# Patient Record
Sex: FEMALE | Race: WHITE | NOT HISPANIC OR LATINO | Employment: UNEMPLOYED | URBAN - NONMETROPOLITAN AREA
[De-identification: names, ages, dates, MRNs, and addresses within clinical notes are randomized per-mention and may not be internally consistent; named-entity substitution may affect disease eponyms.]

---

## 2022-07-16 ENCOUNTER — HOSPITAL ENCOUNTER (EMERGENCY)
Facility: HOSPITAL | Age: 47
Discharge: HOME | End: 2022-07-16
Attending: EMERGENCY MEDICINE | Admitting: EMERGENCY MEDICINE

## 2022-07-16 VITALS
BODY MASS INDEX: 19.62 KG/M2 | TEMPERATURE: 98.1 F | DIASTOLIC BLOOD PRESSURE: 63 MMHG | RESPIRATION RATE: 12 BRPM | SYSTOLIC BLOOD PRESSURE: 94 MMHG | HEART RATE: 75 BPM | HEIGHT: 67 IN | OXYGEN SATURATION: 96 % | WEIGHT: 125 LBS

## 2022-07-16 DIAGNOSIS — R53.1 GENERALIZED WEAKNESS: Primary | ICD-10-CM

## 2022-07-16 LAB
AMPHET+METHAMPHET UR QL: NEGATIVE
AMPHETAMINES UR QL: NEGATIVE
BARBITURATES UR QL SCN: NEGATIVE
BENZODIAZ UR QL SCN: NEGATIVE
BUPRENORPHINE SERPL-MCNC: NEGATIVE NG/ML
CANNABINOIDS SERPL QL: NEGATIVE
COCAINE UR QL: NEGATIVE
METHADONE UR QL SCN: NEGATIVE
OPIATES UR QL: NEGATIVE
OXYCODONE UR QL SCN: NEGATIVE
PCP UR QL SCN: NEGATIVE
PROPOXYPH UR QL: NEGATIVE
TRICYCLICS UR QL SCN: NEGATIVE

## 2022-07-16 PROCEDURE — 99283 EMERGENCY DEPT VISIT LOW MDM: CPT

## 2022-07-16 PROCEDURE — 80306 DRUG TEST PRSMV INSTRMNT: CPT | Performed by: EMERGENCY MEDICINE

## 2022-07-16 NOTE — DISCHARGE INSTRUCTIONS
Follow up with one of the [facility] physician groups below to setup primary care. If you have trouble making an appointment, please call the [facility] Nurse Line at [phone]    [facility] Primary Care - [address]  [id number] [phone]    [facility] Internal Medicine - [address], [address], [place], [place] [id number] [phone]    [facility] Family & Internal Medicine - [address] [address], [address], [place] [address] [phone]     [facility] Primary Care (hospitals) - [place] [address] [facility], [address], [place] [address] [phone]    [facility] Primary Care - [address], [address] [phone]    [facility] Family Medicine - [address], [place] [address] [phone]    [facility] Family Medicine - [place]  [address], [place], [address] [phone]    [facility] Family Medicine - [place]  [address], [address] [phone]    [facility] Primary Care - [address] [address] [phone]    [facility] Family Medicine - [place]  [address] [place] [address], [place], [address] [phone]        PEDIATRIC:    [facility] Pediatrics - [address] [address], [address], [place] [address] [phone]

## 2022-07-16 NOTE — ED PROVIDER NOTES
Subjective   Patient is a 46-year-old female who presents to the ER with altered mental status.  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  Patient states that while at work she started feeling weak and dizzy and felt like she was in a fog.  She also complains of a mild left-sided headache.  Patient's employer states that 2 narcotics are missing from the pillbox and they were concerned the patient may have taken the medication.  They brought her here for drug screen.  Patient denies any recent trauma.  She denies any fever, chest pain, shortness of air, abdominal pain, nausea vomiting diarrhea, urinary changes, focal neurologic changes.          Review of Systems   Eyes: Negative.    Respiratory: Negative.    Cardiovascular: Negative.    Gastrointestinal: Negative.    Endocrine: Negative.    Genitourinary: Negative.    Musculoskeletal: Negative.    Skin: Negative.    Allergic/Immunologic: Negative.    Neurological: Positive for dizziness, weakness and headaches.   Hematological: Negative.    Psychiatric/Behavioral: Positive for confusion.   All other systems reviewed and are negative.      Past Medical History:   Diagnosis Date   • Fibromyalgia        Allergies   Allergen Reactions   • Midodrine Shortness Of Breath       Past Surgical History:   Procedure Laterality Date   •  SECTION         History reviewed. No pertinent family history.    Social History     Socioeconomic History   • Marital status: Single   Tobacco Use   • Smoking status: Current Every Day Smoker     Packs/day: 1.00   Substance and Sexual Activity   • Alcohol use: Yes     Comment: OCC   • Drug use: Not Currently           Objective   Physical Exam  Vitals and nursing note reviewed.   Constitutional:       Appearance: She is well-developed.   HENT:      Head: Normocephalic and atraumatic.   Eyes:      Conjunctiva/sclera: Conjunctivae normal.      Pupils: Pupils are equal, round, and reactive to light.   Cardiovascular:       Rate and Rhythm: Normal rate and regular rhythm.      Heart sounds: Normal heart sounds.   Pulmonary:      Effort: Pulmonary effort is normal.      Breath sounds: Normal breath sounds.   Abdominal:      Palpations: Abdomen is soft.      Tenderness: There is no abdominal tenderness.   Musculoskeletal:         General: No deformity. Normal range of motion.      Cervical back: Normal range of motion.   Skin:     General: Skin is warm.   Neurological:      Mental Status: She is alert and oriented to person, place, and time.      Cranial Nerves: Cranial nerves are intact.      Sensory: Sensation is intact.      Motor: Motor function is intact.      Coordination: Coordination is intact.   Psychiatric:         Behavior: Behavior normal.         Procedures           ED Course      Patient is very adamant that she wants no other work-up besides the drug screen while here in the ER.  She states she does not have any insurance at this time.  She understands the risk of missing serious diagnoses that could result in permanent disability or death but she still chose for for no further work-up at this time.  She is requesting a referral to a PCP.     Lab Results (last 24 hours)     Procedure Component Value Units Date/Time    Urine Drug Screen - Urine, Clean Catch [[id number]]  (Normal) Collected: 07/16/22 0910    Specimen: Urine, Clean Catch Updated: 07/16/22 0927     THC, Screen, Urine Negative     Phencyclidine (PCP), Urine Negative     Cocaine Screen, Urine Negative     Methamphetamine, Ur Negative     Opiate Screen Negative     Amphetamine Screen, Urine Negative     Benzodiazepine Screen, Urine Negative     Tricyclic Antidepressants Screen Negative     Methadone Screen, Urine Negative     Barbiturates Screen, Urine Negative     Oxycodone Screen, Urine Negative     Propoxyphene Screen Negative     Buprenorphine, Screen, Urine Negative    Narrative:      Cutoff For Drugs Screened:    Amphetamines               500  ng/ml  Barbiturates               200 ng/ml  Benzodiazepines            150 ng/ml  Cocaine                    150 ng/ml  Methadone                  200 ng/ml  Opiates                    100 ng/ml  Phencyclidine               25 ng/ml  THC                            50 ng/ml  Methamphetamine            500 ng/ml  Tricyclic Antidepressants  300 ng/ml  Oxycodone                  100 ng/ml  Propoxyphene               300 ng/ml  Buprenorphine               10 ng/ml    The normal value for all drugs tested is negative. This report includes unconfirmed screening results, with the cutoff values listed, to be used for medical treatment purposes only.  Unconfirmed results must not be used for non-medical purposes such as employment or legal testing.  Clinical consideration should be applied to any drug of abuse test, particularly when unconfirmed results are used.          Drug screen was negative.  I spoke with the patient once again and offered to perform labs and imaging but patient continued to deny treatment.  She was very sleepy but oriented x3.  She states her father will come pick her up.  I highly advised she not drive.  She understands the risk of missing serious illness that could lead to permanent disability or death.  She is to return if she changes her mind about any further treatment.  Patient and her boss agreeable.                                MDM    Final diagnoses:   Generalized weakness       ED Disposition  ED Disposition     ED Disposition   Discharge    Condition   Stable    Comment   --             Provider, No Known  [facility]  [place] [address]  [phone]    Schedule an appointment as soon as possible for a visit            Medication List      No changes were made to your prescriptions during this visit.          Robyn Lin MD  07/16/22 9293

## 2023-01-27 ENCOUNTER — TELEPHONE (OUTPATIENT)
Dept: NEUROLOGY | Age: 48
End: 2023-01-27

## 2023-02-28 ENCOUNTER — OFFICE VISIT (OUTPATIENT)
Dept: NEUROLOGY | Age: 48
End: 2023-02-28
Payer: COMMERCIAL

## 2023-02-28 VITALS
OXYGEN SATURATION: 98 % | WEIGHT: 127 LBS | DIASTOLIC BLOOD PRESSURE: 71 MMHG | HEART RATE: 84 BPM | BODY MASS INDEX: 19.93 KG/M2 | HEIGHT: 67 IN | SYSTOLIC BLOOD PRESSURE: 126 MMHG

## 2023-02-28 DIAGNOSIS — R11.2 NAUSEA AND VOMITING, UNSPECIFIED VOMITING TYPE: ICD-10-CM

## 2023-02-28 DIAGNOSIS — G43.119 INTRACTABLE MIGRAINE WITH AURA WITHOUT STATUS MIGRAINOSUS: Primary | ICD-10-CM

## 2023-02-28 PROCEDURE — 99214 OFFICE O/P EST MOD 30 MIN: CPT | Performed by: NURSE PRACTITIONER

## 2023-02-28 RX ORDER — BUTALBITAL, ACETAMINOPHEN AND CAFFEINE 300; 40; 50 MG/1; MG/1; MG/1
CAPSULE ORAL
COMMUNITY
Start: 2023-02-24

## 2023-02-28 RX ORDER — METHYLPREDNISOLONE 4 MG/1
TABLET ORAL
Qty: 1 KIT | Refills: 0 | Status: SHIPPED | OUTPATIENT
Start: 2023-02-28 | End: 2023-03-06

## 2023-02-28 RX ORDER — ONDANSETRON 4 MG/1
4 TABLET, FILM COATED ORAL DAILY PRN
Qty: 30 TABLET | Refills: 0 | Status: SHIPPED | OUTPATIENT
Start: 2023-02-28

## 2023-02-28 RX ORDER — OMEPRAZOLE 20 MG/1
CAPSULE, DELAYED RELEASE ORAL
COMMUNITY
Start: 2023-02-20

## 2023-02-28 RX ORDER — AMITRIPTYLINE HYDROCHLORIDE 10 MG/1
TABLET, FILM COATED ORAL
COMMUNITY
Start: 2023-02-27

## 2023-02-28 RX ORDER — AZELASTINE HYDROCHLORIDE 0.5 MG/ML
SOLUTION/ DROPS OPHTHALMIC
COMMUNITY
Start: 2023-02-19

## 2023-02-28 RX ORDER — LAMOTRIGINE 200 MG/1
TABLET ORAL
COMMUNITY
Start: 2023-02-15

## 2023-02-28 RX ORDER — UBROGEPANT 100 MG/1
TABLET ORAL
Qty: 16 TABLET | Refills: 3 | Status: SHIPPED | OUTPATIENT
Start: 2023-02-28

## 2023-02-28 RX ORDER — ALBUTEROL SULFATE 90 UG/1
AEROSOL, METERED RESPIRATORY (INHALATION)
COMMUNITY
Start: 2023-02-20

## 2023-02-28 RX ORDER — ATOGEPANT 60 MG/1
60 TABLET ORAL DAILY
Qty: 30 TABLET | Refills: 3 | Status: SHIPPED | OUTPATIENT
Start: 2023-02-28

## 2023-02-28 RX ORDER — ALPRAZOLAM 0.25 MG/1
TABLET ORAL
COMMUNITY
Start: 2023-01-25

## 2023-02-28 RX ORDER — TOPIRAMATE 100 MG/1
TABLET, FILM COATED ORAL
COMMUNITY
Start: 2023-02-20

## 2023-02-28 RX ORDER — DULOXETIN HYDROCHLORIDE 30 MG/1
CAPSULE, DELAYED RELEASE ORAL
COMMUNITY
Start: 2023-02-14

## 2023-02-28 NOTE — PROGRESS NOTES
[facility] NEUROLOGY:    Patient: Nahomy Mares   :  1975  Age:  50 y.o. MRN:  [id number]  Today:  23    Provider: DEVON Pennington    Chief Complaint:  Chief Complaint   Patient presents with    New Patient    Migraine       HISTORY OF PRESENT ILLNESS:   Nahomy Mares is a 50y.o. year old female was referred for headaches. Headaches first started when she was 11years old and have progressively worsened. Pain is located to the left retro-orbital area and then spreads to left side and is described as dull to sharp throbbing pain that is severe in nature. Headaches are daily. Headaches do wake her from sleep. There is associated photophobia, phonophobia, nausea. Denies diplopia, dawood visual loss, jaw claudication. She describes scintillating scotoma, states she has a blurred spot in vision preceding headaches. She also has some twitching to left eye as well. Denies lacrimation, conjunctival injection, mental status changes, or ptosis. Headaches are not aggravated by position changes. Headaches are aborted by Fioricet in the past, laying in cold dark room. They are triggered by stress, weather changes, chocolate. She is on Elavil 10 mg daily, Topamax 100 mg, Lamictal 200 mg daily for prevention. She has tried Propranolol in the past, issues with hypotension. She has been on Imitrex in the past and it did not help. She states she had possible issue with this in the past, had right sided weakness. She was on Midrine after this and stopped having benefit. She states that Fioricet does work in the past. Has seen Neurology in the past. Last MRI years ago. She also reports some issues with imbalance and tremor; this is fairly new. Is falling into walls at times. Tremor to bilateral hands, ongoing for some time. Mom has ET versus parkinson's disease. There is known Parksinon's disease on dads side.      Additional Relevant History:  History of head/neck trauma:  no  History of head/neck surgery: no  Family h/o headaches or aneurysm:  no, no. PAST MEDICAL HISTORY:    Medical History:  History reviewed. No pertinent past medical history. Surgical History:  History reviewed. No pertinent surgical history. Current Medications:  Current Outpatient Medications   Medication Sig Dispense Refill    albuterol sulfate HFA (PROVENTIL;VENTOLIN;PROAIR) 108 (90 Base) MCG/ACT inhaler       ALPRAZolam (XANAX) 0.25 MG tablet       amitriptyline (ELAVIL) 10 MG tablet       azelastine (OPTIVAR) 0.05 % ophthalmic solution       butalbital-APAP-caffeine -40 MG CAPS per capsule       DULoxetine (CYMBALTA) 30 MG extended release capsule       BREO ELLIPTA 200-25 MCG/ACT AEPB inhaler       lamoTRIgine (LAMICTAL) 200 MG tablet       omeprazole (PRILOSEC) 20 MG delayed release capsule       topiramate (TOPAMAX) 100 MG tablet       Ubrogepant (UBRELVY) 100 MG TABS Take 1 tablet at the onset of migraine. May repeat once in 2 hours if no improvement. Do not exceed 2 tablets in 24 hours. 16 tablet 3    Atogepant (QULIPTA) 60 MG TABS Take 60 mg by mouth daily 30 tablet 3    ondansetron (ZOFRAN) 4 MG tablet Take 1 tablet by mouth daily as needed for Nausea or Vomiting 30 tablet 0    methylPREDNISolone (MEDROL DOSEPACK) 4 MG tablet Take by mouth. 1 kit 0     No current facility-administered medications for this visit.        Allergies:  Meperidine and Phenergan [promethazine]    SOCIAL HISTORY:   Social History     Socioeconomic History    Marital status:      Spouse name: Not on file    Number of children: Not on file    Years of education: Not on file    Highest education level: Not on file   Occupational History    Not on file   Tobacco Use    Smoking status: Every Day     Types: Cigarettes    Smokeless tobacco: Not on file   Substance and Sexual Activity    Alcohol use: Not on file    Drug use: Not on file    Sexual activity: Not on file   Other Topics Concern    Not on file   Social History Narrative    Not on file     Social Determinants of Health     Financial Resource Strain: Not on file   Food Insecurity: Not on file   Transportation Needs: Not on file   Physical Activity: Not on file   Stress: Not on file   Social Connections: Not on file   Intimate Partner Violence: Not on file   Housing Stability: Not on file       FAMILY HISTORY:   History reviewed. No pertinent family history. REVIEW OF SYSTEMS:  Constitutional: []Fever []Sweat []Chills [] Recent Injury [x] Denies all unless marked  HEENT:[x]Headache  [] Head Injury/Hearing Loss  [] Sore Throat  [] Ear Ache/Dizziness  [x] Denies all unless marked  Spine:  [] Neck pain  [] Back pain  [] Sciaticia  [x] Denies all unless marked  Cardiovascular:[]Heart Disease [x]Chest Pain [] Palpitations  [x] Denies all unless marked  Pulmonary: []Shortness of Breath []Cough   [x] Denies all unless marke  Gastrointestinal: []Nausea  []Vomiting  []Abdominal Pain  []Constipation  []Diarrhea  []Dark Bloody Stools  [x] Denies all unless marked  Psychiatric/Behavioral:[] Depression [x] Anxiety [x] Denies all unless marked  Genitourinary:   [] Frequency  [] Urgency  [] Incontinence [] Pain with Urination  [x] Denies all unless marked  Extremities: []Pain  []Swelling  [x] Denies all unless marked  Musculoskeletal: [x] Muscle Pain  [x] Joint Pain  [] Arthritis [] Muscle Cramps [] Muscle Twitches  [x] Denies all unless marked  Sleep: [x] Insomnia [] Snoring [] Restless Legs [] Sleep Apnea  [] Daytime Sleepiness  [x] Denies all unless marked  Skin:[] Rash [] Skin Discoloration [x] Denies all unless marked   Neurological: []Visual Disturbance/Memory Loss [x] Loss of Balance [] Slurred Speech/Weakness [] Seizures  [x] Vertigo/Dizziness [x] Denies all unless marked       The MA has completed the ROS with the patient. I have reviewed it in its' entirety with the patient and agree with the documentation.       PHYSICAL EXAMINATION:  Vitals: /71   Pulse 84   Ht 5' 7\" (1.702 m)   Wt 127 lb (57.6 kg)   SpO2 98%   BMI 19.89 kg/m²   Constitutional - No acute distress    HEENT- Conjunctiva normal.  No scars, masses, or lesions over external nose or ears, no neck masses noted, no jugular vein distension, no bruit  Cardiac- Regular rate and rhythm  Pulmonary- Clear to auscultation, good expansion, normal effort without use of accessory muscles  Musculoskeletal - No significant wasting of muscles noted, no bony deformities  Extremities - No clubbing, cyanosis or edema  Skin - Warm, dry, and intact. No rash, erythema, or pallor  Psychiatric - Mood, affect, and behavior appear normal          NEUROLOGIC EXAMINATION:    Mental status   [x]Awake, alert, oriented   [x]Affect attention and concentration appear appropriate  [x]Recent and remote memory appears unremarkable  [x]Speech normal without dysarthria or aphasia, comprehension and repetition intact. COMMENTS:    Cranial Nerves [x]No VF deficit to confrontation  [x]PERRLA, EOMI, no nystagmus, conjugate eye movements, no ptosis  [x]Face symmetric  [x]Facial sensation intact  [x]Tongue midline no atrophy or fasciculations present  [x]Palate midline, hearing to finger rub normal bilaterally  [x]Shoulder shrug and SCM testing normal bilaterally  COMMENTS:   Motor   [x]5/5 strength x 4 extremities  [x]Normal bulk and tone  [x]No tremor present  [x]No rigidity or bradykinesia noted  COMMENTS:   Sensory  [x]Sensation intact to light touch, vibration, and proprioception BLE  [x]Sensation intact to light touch, vibration, and proprioception BUE  COMMENTS:   Coordination [x]FTN normal bilaterally   []HTS normal bilaterally  [x]JENNIFER normal bilaterally. COMMENTS:   Reflexes  [x]Symmetric and non-pathological  []Toes down going bilaterally  [x]No clonus present  COMMENTS:   Gait                  [x]Normal steady gait    []Ataxic    []Spastic     []Magnetic     []Shuffling  COMMENTS:       ADDITIONAL REVIEW:    Reviewed referral records.     IMPRESSION:  Shirlene Schirmer Ivania Christiansen is a 50 y.o. female here today for evaluation of headaches. She has long prior history of migraine disease, feels like it has been worsening. She is currently on Elavil, Topamax, and Lamictal.  No clear benefit as far as prevention goes from these medicines. She is taking Fioricet as needed with relief at times. 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 She uses Zofran as needed as well. No recent imaging. She also notes some new issues with imbalance and tremor. States mom has ET, there is also Parkinson's disease in her family. Tremor seems to be with intention. Exam nonfocal.  No clear tremor or parkinsonian symptoms seen today. Headaches appear migrainous in nature, she describes skin relating scotoma prior to headaches. Triptans and beta-blocker are not appropriate for her given this. She also states she has tried propranolol in the past and had issues with hypotension. We will trial Costa Akua daily and Ubrelvy as needed. Zofran as needed. MRI brain to rule out any secondary source. We will also send in Medrol Dosepak to try to abort current headache. ICD-10-CM    1. Intractable migraine with aura without status migrainosus  G43. 119 MRI BRAIN W WO CONTRAST     Ubrogepant (UBRELVY) 100 MG TABS     Atogepant (QULIPTA) 60 MG TABS     ondansetron (ZOFRAN) 4 MG tablet     methylPREDNISolone (MEDROL DOSEPACK) 4 MG tablet      2. Nausea and vomiting, unspecified vomiting type  R11.2 ondansetron (ZOFRAN) 4 MG tablet            PLAN:  Start Costa Akua daily. Discussed side effects. Samples given today. Start [place] as needed. Samples given today. Side effects discussed. Zofran as needed. Fioricet as needed, use sparingly. MRI brain W WO.  6. Medrol Dosepak. Side effects discussed.   7. Patient advised of the pathophysiology, etiology, and diagnosis of migraines, along with treatment options, and treatment side effects. 8. Return in about 3 months (around 5/28/2023) for Follow up, sooner with worsening symptoms. Libby Officer, DEVON - THANIA         This dictation was generated by voice recognition computer software. Although all attempts are made to edit the dictation for accuracy, there may be errors in the transcription that are not intended.

## 2023-05-26 ENCOUNTER — TELEPHONE (OUTPATIENT)
Dept: NEUROLOGY | Age: 48
End: 2023-05-26

## 2023-05-26 NOTE — TELEPHONE ENCOUNTER
Attempted to contact patient to find out about MRI. No answer at this time.  Unable to leave voicemail as no VM box set up